# Patient Record
Sex: FEMALE | Employment: OTHER | URBAN - METROPOLITAN AREA
[De-identification: names, ages, dates, MRNs, and addresses within clinical notes are randomized per-mention and may not be internally consistent; named-entity substitution may affect disease eponyms.]

---

## 2019-11-24 ENCOUNTER — HOSPITAL ENCOUNTER (INPATIENT)
Facility: CLINIC | Age: 49
LOS: 9 days | Discharge: HOME | DRG: 885 | End: 2019-12-04
Attending: EMERGENCY MEDICINE | Admitting: PSYCHIATRY & NEUROLOGY
Payer: COMMERCIAL

## 2019-11-24 DIAGNOSIS — R45.851 SUICIDAL THOUGHTS: ICD-10-CM

## 2019-11-24 DIAGNOSIS — F29 PSYCHOSIS, UNSPECIFIED PSYCHOSIS TYPE (H): Primary | ICD-10-CM

## 2019-11-24 DIAGNOSIS — R44.0 AUDITORY HALLUCINATIONS: ICD-10-CM

## 2019-11-24 LAB
ALBUMIN SERPL-MCNC: 3.5 G/DL (ref 3.4–5)
ALBUMIN UR-MCNC: NEGATIVE MG/DL
ALP SERPL-CCNC: 95 U/L (ref 40–150)
ALT SERPL W P-5'-P-CCNC: 15 U/L (ref 0–50)
AMPHETAMINES UR QL SCN: POSITIVE
ANION GAP SERPL CALCULATED.3IONS-SCNC: 3 MMOL/L (ref 3–14)
APPEARANCE UR: CLEAR
AST SERPL W P-5'-P-CCNC: 10 U/L (ref 0–45)
BARBITURATES UR QL: NEGATIVE
BASOPHILS # BLD AUTO: 0 10E9/L (ref 0–0.2)
BASOPHILS NFR BLD AUTO: 0.3 %
BENZODIAZ UR QL: NEGATIVE
BILIRUB SERPL-MCNC: 0.8 MG/DL (ref 0.2–1.3)
BILIRUB UR QL STRIP: NEGATIVE
BUN SERPL-MCNC: 15 MG/DL (ref 7–30)
CALCIUM SERPL-MCNC: 8.4 MG/DL (ref 8.5–10.1)
CANNABINOIDS UR QL SCN: NEGATIVE
CHLORIDE SERPL-SCNC: 107 MMOL/L (ref 94–109)
CO2 SERPL-SCNC: 27 MMOL/L (ref 20–32)
COCAINE UR QL: NEGATIVE
COLOR UR AUTO: YELLOW
CREAT SERPL-MCNC: 0.53 MG/DL (ref 0.52–1.04)
CRP SERPL-MCNC: 6.1 MG/L (ref 0–8)
DIFFERENTIAL METHOD BLD: NORMAL
EOSINOPHIL # BLD AUTO: 0.1 10E9/L (ref 0–0.7)
EOSINOPHIL NFR BLD AUTO: 1.3 %
ERYTHROCYTE [DISTWIDTH] IN BLOOD BY AUTOMATED COUNT: 15 % (ref 10–15)
ERYTHROCYTE [SEDIMENTATION RATE] IN BLOOD BY WESTERGREN METHOD: 28 MM/H (ref 0–30)
ETHANOL SERPL-MCNC: <0.01 G/DL
GFR SERPL CREATININE-BSD FRML MDRD: >90 ML/MIN/{1.73_M2}
GLUCOSE SERPL-MCNC: 121 MG/DL (ref 70–99)
GLUCOSE UR STRIP-MCNC: NEGATIVE MG/DL
HCT VFR BLD AUTO: 37.8 % (ref 35–47)
HGB BLD-MCNC: 12.2 G/DL (ref 11.7–15.7)
HGB UR QL STRIP: ABNORMAL
IMM GRANULOCYTES # BLD: 0 10E9/L (ref 0–0.4)
IMM GRANULOCYTES NFR BLD: 0.1 %
KETONES UR STRIP-MCNC: 10 MG/DL
LEUKOCYTE ESTERASE UR QL STRIP: ABNORMAL
LYMPHOCYTES # BLD AUTO: 1.9 10E9/L (ref 0.8–5.3)
LYMPHOCYTES NFR BLD AUTO: 25 %
MCH RBC QN AUTO: 27.7 PG (ref 26.5–33)
MCHC RBC AUTO-ENTMCNC: 32.3 G/DL (ref 31.5–36.5)
MCV RBC AUTO: 86 FL (ref 78–100)
MONOCYTES # BLD AUTO: 0.7 10E9/L (ref 0–1.3)
MONOCYTES NFR BLD AUTO: 8.7 %
MUCOUS THREADS #/AREA URNS LPF: PRESENT /LPF
NEUTROPHILS # BLD AUTO: 4.9 10E9/L (ref 1.6–8.3)
NEUTROPHILS NFR BLD AUTO: 64.6 %
NITRATE UR QL: NEGATIVE
NRBC # BLD AUTO: 0 10*3/UL
NRBC BLD AUTO-RTO: 0 /100
OPIATES UR QL SCN: NEGATIVE
PCP UR QL SCN: NEGATIVE
PH UR STRIP: 6 PH (ref 5–7)
PLATELET # BLD AUTO: 266 10E9/L (ref 150–450)
POTASSIUM SERPL-SCNC: 3.4 MMOL/L (ref 3.4–5.3)
PROT SERPL-MCNC: 7.3 G/DL (ref 6.8–8.8)
RBC # BLD AUTO: 4.4 10E12/L (ref 3.8–5.2)
RBC #/AREA URNS AUTO: 8 /HPF (ref 0–2)
SODIUM SERPL-SCNC: 137 MMOL/L (ref 133–144)
SOURCE: ABNORMAL
SP GR UR STRIP: 1.02 (ref 1–1.03)
SQUAMOUS #/AREA URNS AUTO: 2 /HPF (ref 0–1)
TSH SERPL DL<=0.005 MIU/L-ACNC: 2.38 MU/L (ref 0.4–4)
UROBILINOGEN UR STRIP-MCNC: NORMAL MG/DL (ref 0–2)
WBC # BLD AUTO: 7.6 10E9/L (ref 4–11)
WBC #/AREA URNS AUTO: 1 /HPF (ref 0–5)

## 2019-11-24 PROCEDURE — 81001 URINALYSIS AUTO W/SCOPE: CPT | Performed by: EMERGENCY MEDICINE

## 2019-11-24 PROCEDURE — 86140 C-REACTIVE PROTEIN: CPT | Performed by: EMERGENCY MEDICINE

## 2019-11-24 PROCEDURE — 85025 COMPLETE CBC W/AUTO DIFF WBC: CPT | Performed by: EMERGENCY MEDICINE

## 2019-11-24 PROCEDURE — 99285 EMERGENCY DEPT VISIT HI MDM: CPT | Mod: 25

## 2019-11-24 PROCEDURE — 80307 DRUG TEST PRSMV CHEM ANLYZR: CPT | Performed by: EMERGENCY MEDICINE

## 2019-11-24 PROCEDURE — 90791 PSYCH DIAGNOSTIC EVALUATION: CPT

## 2019-11-24 PROCEDURE — 25000132 ZZH RX MED GY IP 250 OP 250 PS 637: Performed by: EMERGENCY MEDICINE

## 2019-11-24 PROCEDURE — 85652 RBC SED RATE AUTOMATED: CPT | Performed by: EMERGENCY MEDICINE

## 2019-11-24 PROCEDURE — 80320 DRUG SCREEN QUANTALCOHOLS: CPT | Performed by: EMERGENCY MEDICINE

## 2019-11-24 PROCEDURE — 84443 ASSAY THYROID STIM HORMONE: CPT | Performed by: EMERGENCY MEDICINE

## 2019-11-24 PROCEDURE — 80053 COMPREHEN METABOLIC PANEL: CPT | Performed by: EMERGENCY MEDICINE

## 2019-11-24 PROCEDURE — 87086 URINE CULTURE/COLONY COUNT: CPT | Performed by: EMERGENCY MEDICINE

## 2019-11-24 PROCEDURE — 36415 COLL VENOUS BLD VENIPUNCTURE: CPT

## 2019-11-24 RX ORDER — ACETAMINOPHEN 325 MG/1
650 TABLET ORAL ONCE
Status: COMPLETED | OUTPATIENT
Start: 2019-11-24 | End: 2019-11-24

## 2019-11-24 RX ADMIN — ACETAMINOPHEN 650 MG: 325 TABLET, FILM COATED ORAL at 20:34

## 2019-11-24 ASSESSMENT — ENCOUNTER SYMPTOMS
HALLUCINATIONS: 1
ARTHRALGIAS: 1

## 2019-11-24 ASSESSMENT — MIFFLIN-ST. JEOR: SCORE: 1098.96

## 2019-11-25 PROBLEM — F29 PSYCHOSIS (H): Status: ACTIVE | Noted: 2019-11-25

## 2019-11-25 LAB
BACTERIA SPEC CULT: NORMAL
Lab: NORMAL
SPECIMEN SOURCE: NORMAL

## 2019-11-25 PROCEDURE — 25000132 ZZH RX MED GY IP 250 OP 250 PS 637: Performed by: EMERGENCY MEDICINE

## 2019-11-25 PROCEDURE — 12400000 ZZH R&B MH

## 2019-11-25 PROCEDURE — 25000132 ZZH RX MED GY IP 250 OP 250 PS 637: Performed by: PSYCHIATRY & NEUROLOGY

## 2019-11-25 RX ORDER — IBUPROFEN 200 MG
800 TABLET ORAL EVERY 8 HOURS PRN
COMMUNITY

## 2019-11-25 RX ORDER — OLANZAPINE 10 MG/1
10 TABLET, ORALLY DISINTEGRATING ORAL 2 TIMES DAILY PRN
Status: DISCONTINUED | OUTPATIENT
Start: 2019-11-25 | End: 2019-11-25 | Stop reason: DRUGHIGH

## 2019-11-25 RX ORDER — OLANZAPINE 10 MG/2ML
10 INJECTION, POWDER, FOR SOLUTION INTRAMUSCULAR DAILY PRN
Status: DISCONTINUED | OUTPATIENT
Start: 2019-11-25 | End: 2019-11-25

## 2019-11-25 RX ORDER — MULTIVIT WITH MINERALS/LUTEIN
500 TABLET ORAL DAILY
COMMUNITY

## 2019-11-25 RX ORDER — OLANZAPINE 10 MG/2ML
10 INJECTION, POWDER, FOR SOLUTION INTRAMUSCULAR EVERY 6 HOURS PRN
Status: DISCONTINUED | OUTPATIENT
Start: 2019-11-25 | End: 2019-12-04 | Stop reason: HOSPADM

## 2019-11-25 RX ORDER — ASCORBIC ACID 500 MG
500 TABLET ORAL DAILY
Status: DISCONTINUED | OUTPATIENT
Start: 2019-11-25 | End: 2019-12-04 | Stop reason: HOSPADM

## 2019-11-25 RX ORDER — IBUPROFEN 400 MG/1
800 TABLET, FILM COATED ORAL EVERY 8 HOURS PRN
Status: DISCONTINUED | OUTPATIENT
Start: 2019-11-25 | End: 2019-12-04 | Stop reason: HOSPADM

## 2019-11-25 RX ORDER — OLANZAPINE 10 MG/1
10 TABLET, ORALLY DISINTEGRATING ORAL EVERY 6 HOURS PRN
Status: DISCONTINUED | OUTPATIENT
Start: 2019-11-25 | End: 2019-12-04 | Stop reason: HOSPADM

## 2019-11-25 RX ORDER — HYDROXYZINE HYDROCHLORIDE 25 MG/1
25 TABLET, FILM COATED ORAL EVERY 4 HOURS PRN
Status: DISCONTINUED | OUTPATIENT
Start: 2019-11-25 | End: 2019-12-04 | Stop reason: HOSPADM

## 2019-11-25 RX ORDER — LORAZEPAM 1 MG/1
1 TABLET ORAL EVERY 8 HOURS PRN
Status: DISCONTINUED | OUTPATIENT
Start: 2019-11-25 | End: 2019-11-25

## 2019-11-25 RX ADMIN — LORAZEPAM 1 MG: 1 TABLET ORAL at 04:51

## 2019-11-25 RX ADMIN — OXYCODONE HYDROCHLORIDE AND ACETAMINOPHEN 500 MG: 500 TABLET ORAL at 15:59

## 2019-11-25 RX ADMIN — IBUPROFEN 800 MG: 400 TABLET ORAL at 15:59

## 2019-11-25 RX ADMIN — OLANZAPINE 10 MG: 10 TABLET, ORALLY DISINTEGRATING ORAL at 16:52

## 2019-11-25 ASSESSMENT — ACTIVITIES OF DAILY LIVING (ADL)
RETIRED_EATING: 0-->INDEPENDENT
DRESS: SCRUBS (BEHAVIORAL HEALTH);PROMPTS
BATHING: 0-->INDEPENDENT
HYGIENE/GROOMING: PROMPTS
COGNITION: 0 - NO COGNITION ISSUES REPORTED
RETIRED_COMMUNICATION: 0-->UNDERSTANDS/COMMUNICATES WITHOUT DIFFICULTY
DRESS: 0-->INDEPENDENT
AMBULATION: 0-->INDEPENDENT
ORAL_HYGIENE: PROMPTS
LAUNDRY: UNABLE TO COMPLETE
FALL_HISTORY_WITHIN_LAST_SIX_MONTHS: NO
TOILETING: 0-->INDEPENDENT
TRANSFERRING: 0-->INDEPENDENT
SWALLOWING: 0-->SWALLOWS FOODS/LIQUIDS WITHOUT DIFFICULTY

## 2019-11-25 ASSESSMENT — MIFFLIN-ST. JEOR: SCORE: 1062.67

## 2019-11-25 NOTE — ED PROVIDER NOTES
No acute issues.  Accepted under [facility].  MD Kathy Sanchez, Lucia Cordon MD  11/25/19 0261

## 2019-11-25 NOTE — PROGRESS NOTES
.Welcome packet reviewed with patient. Information reviewed includes getting emergency help, preventing infections, understanding your care, using medication safely, reducing falls, preventing pressure ulcers, smoking cessation, powerful choices and Patients Bill of Rights. Pt. given tour of the unit and instruction on use of facility including emergency call light. Program schedule reviewed with patient. Questions regarding the unit addressed. Pt. Search completed and belongings inventoried.   .Nursing assessment complete including patient and medication profiles. Risk assessments completed addressing suicide,fall,skin,nutrition and safety issues. Care plan initiated. Assessments reviewed with physician and admit orders received. Video monitoring in progress, Patient Informed.

## 2019-11-25 NOTE — PROGRESS NOTES
Admitted from the ER with auditory hallucinations, labile mood changes.  Patient lives in [place] with her  but her family picked her up because  had a heart attack and he can not longer take care of her.   Her thoughts are disorganized. Speech is fast, speaks English and Yakut, and she refuses an .  She was born and raised in [facility] and she appears to have lost receptive and expressive skills in English.    Refuses an , she tells me that I should be here working every day because I do speak Scottish,  She appears to hear voices,  Reported voices are around her. Has poor insight, labile, loud at times, tense, can not sit still, denies any SI.

## 2019-11-25 NOTE — ED PROVIDER NOTES
History     Chief Complaint:  Joint Pain and Suicidal    HPI   Donovan Jauregui is a 49 year old female who presents with joint pain and suicidal. The patient's sister states she picked the patient up from [place] 5 days prior to help her with her primary care. She says the patient's  is unable to care for her due to recently having a heart attack. The sister reports the patient has been known to wander off when she is angered and has to be found a couple days later. The patient states she has rheumatoid arthritis and has not been taking medication for it. During evaluation, the patient admits to having vague suicidal thoughts but denies any plans. She further admits to hearing many voices for the past 1-2 years but has not been seen for it. She denies homicidal ideation. The patient denies drug use, although the sister says she has heard the patient uses methamphetamines.    Allergies:  No known drug allergies    Medications:    The patient is not currently taking any prescribed medications.    Past Medical History:    Mood disorder  Suicidal ideation    Past Surgical History:    History reviewed. No pertinent surgical history.    Family History:    History reviewed. No pertinent family history.     Social History:  Smoking status: Yes  Alcohol use: Yes  Drug use: No  The patient presents to the emergency department with sister.     Review of Systems   Musculoskeletal: Positive for arthralgias.   Psychiatric/Behavioral: Positive for hallucinations and suicidal ideas.   All other systems reviewed and are negative.    Physical Exam   Patient Vitals for the past 24 hrs:   BP Temp Temp src Pulse Resp SpO2 Height Weight   11/24/19 1956 (!) 156/80 98.5  F (36.9  C) Oral 108 14 98 % 1.524 m (5') 56.2 kg (124 lb)     Physical Exam  Constitutional: Small  female supine  HENT: No signs of trauma.   Eyes: EOM are normal. Pupils are equal, round, and reactive to light.   Neck: Normal range of motion. No JVD present.  No cervical adenopathy.  Cardiovascular: 1/6  systolic murmur LSB;  Regular rhythm  Pulmonary/Chest: Bilateral breath sounds normal. No wheezes, rhonchi or rales.  Abdominal: Soft. No tenderness. No rebound or guarding.   Musculoskeletal: No obvious joint swelling or thickening for both upper and lower extremities. no edema  Lymphadenopathy: No lymphadenopathy.   Neurological: Alert and oriented to person, place, and time. Normal strength. Coordination normal.   Psych: Animated, no overt psychosis, admits to vague suicidal thoughts with no plan, no homicidal thoughts, admits to hearing voices over the past year.  Skin: Skin is warm and dry. No rash noted. No erythema.     Emergency Department Course   Laboratory:  CBC: 7.6, HGB 12.2,   CMP: Glucose 121, calcium 8.4, Creatinine 0.53  TSH 2.38  CRP 6.1  Alcohol <0.01  Drug abuse screen: amphetamine pos, o/w neg.  Sed rate 28    UA: ketones 10, blood small, leukocyte large, RBC 8, squamous 2, mucous present, o/w neg.  Urine culture: pending    Interventions:  2034 Tylenol 650 mg PO    Emergency Department Course:  Past medical records, nursing notes, and vitals reviewed.  2005: I performed an exam of the patient and obtained history, as documented above.    IV inserted and blood drawn.    0005: I discussed the patient with DEC.    0013: Patient was placed on a 72-hour hold for admission.    Impression & Plan    Medical Decision Making:  Donovan Jauregui is a 49 year old female who presents to the ED with her sister. She had her sister bus up from [place] because she has been disappearing and not showing up for many days. Her  has been unable to care for her and had her come up here. Her sister thinks she has used amphetamines in the past and had remote closed head trauma. The patient has been having suicidal thought, although does not have a plan but states she would be okay not waking up in the morning. She has not tried or done anything yet. She also admits  to hearing voices over at least the past year. The only medical problems the patient's sister is aware of is she has rheumatoid arthritis and is on Humira, with her last dose a month ago. The patient itself is quite animated. She denies any drug use. She just came here because her joints were sore she states. The sister is very fearful that the patient will be upset and take off and not be able to care for herself. Workup medically was unremarkably other than amphetamines in the toxin screen. Patient was evaluated by tele DEC, who felt a 72-hour hold was in order for admission. I have placed patient on boarding orders, including Zyprexa for medication and Ativan as well. All forms have been filled out and patient will be admitted once a bed is found either here or [facility]. I also placed a psych consult to be done in the morning if the patient is still here.       Diagnosis:    ICD-10-CM   1. Suicidal thoughts R45.851   2. Auditory hallucinations R44.0     Disposition:  Placed on 72-hour hold for admission.            Scribe Disclosure:  I, Jayesh Fragoso, am serving as a scribe at 8:05 PM on 11/24/2019 to document services personally performed by Jason Medel MD based on my observations and the provider's statements to me.    [facility] EMERGENCY DEPARTMENT         Jason Medel MD  11/25/19 0204

## 2019-11-25 NOTE — PROGRESS NOTES
"She is abrupt, refuses to signs papers, she is paranoid, looks over her shoulders, she said  \"  I going back to [place], tomorrow morning\"  ,She tells me, that her family does not want her here, she said she took a bus from [place] to visit her sisters, but her sisters are not willing to have her at home.  \" That is the reason, Dionne her sister ,dropped her to the Hospital.    I spoke to her in English and she switch the conversation to Azeri. Refuses an .   "

## 2019-11-25 NOTE — PHARMACY-ADMISSION MEDICATION HISTORY
Pharmacy Medication History  Admission medication history interview status for the 11/24/2019  admission is complete. See EPIC admission navigator for prior to admission medications     Medication history sources: Patient  Medication history source reliability: Poor  Adherence assessment: has not seen a physician in a long time so has not been taking any prescription medications for about a year    Significant changes made to the medication list:  none      Additional medication history information:   Used to use humira and something for reflux (possibly omeprazole)    Medication reconciliation completed by provider prior to medication history? No    Time spent in this activity: 20 minutes      Prior to Admission medications    Medication Sig Last Dose Taking? Auth Provider   ibuprofen (ADVIL/MOTRIN) 200 MG tablet Take 800 mg by mouth every 8 hours as needed for mild pain  Yes Unknown, Entered By History   vitamin C (ASCORBIC ACID) 250 MG tablet Take 500 mg by mouth daily Unsure of strength  Yes Unknown, Entered By History

## 2019-11-25 NOTE — ED TRIAGE NOTES
Patient states she is having pain in her bones everywhere.  Running fever, also having mood swings, crying.  Mental health issues that haven't been addressed.  Suicidal.  Wants to go to sleep & not wake up.  Thoughts started a month ago.  Not taking medications & does not remember names.

## 2019-11-25 NOTE — ED NOTES
Patient continuing to refuse for therapeutic search.  Patient placed on 1:1 continuous observation.  MD and charge nurse contacted.

## 2019-11-25 NOTE — ED PROVIDER NOTES
Signed out by Dr. Medel.  In brief, has hallucinations and suicidal thoughts.  History of running away in [place] and  helped arrange bus here to be with sister.  Sister brought her in.  DEC evaluated her.  Is pending admission.         Oralia Gaytan MD  12/06/19 2291

## 2019-11-25 NOTE — ED NOTES
Patient transported upstairs with ERT and security.  Patients sister updated with permission from patient.

## 2019-11-25 NOTE — PROGRESS NOTES
11/25/19 1430   Patient Belongings   Did you bring any home meds/supplements to the hospital?  No   Patient Belongings locker   Patient Belongings Put in Hospital Secure Location (Security or Locker, etc.) other (see comments)   Belongings Search Yes   Clothing Search Yes   Second Staff Susan, RN     1x mcdonald backpack  1x teal dress  1x pair brn slippers  1x blue jeans  1x grey shorts  1x teal purse w/ no contents   1x address book w/misc paperwork  1x pack of wash clothes  1x tan bra  1x blue knit hat  1x swim suit top  1x maroon jeans  1x black leggings   1x scarf  1x blue tshirt   1x pink long sleeve   1x blue shorts  1x pack of underwear   1x jeans  misc paperwork  1x chapstick  1x empty black wallet  1x multi colored purse  1x black wallet  1x pack of cigaretts w/lighter  1x hand lotion  1x black sweatpants w/string  1x purple jacket with string  1x pair of black boots                  Admission:  I am responsible for any personal items that are not sent to the safe or pharmacy.  [facility] is not responsible for loss, theft or damage of any property in my possession.    Signature:  _________________________________ Date: _______  Time: _____                                              Staff Signature:  ____________________________ Date: ________  Time: _____      2nd Staff person, if patient is unable/unwilling to sign:    Signature: ________________________________ Date: ________  Time: _____     Discharge:  [facility] has returned all of my personal belongings:    Signature: _________________________________ Date: ________  Time: _____                                          Staff Signature:  ____________________________ Date: ________  Time: _____

## 2019-11-26 PROCEDURE — 25000132 ZZH RX MED GY IP 250 OP 250 PS 637: Performed by: PSYCHIATRY & NEUROLOGY

## 2019-11-26 PROCEDURE — 99222 1ST HOSP IP/OBS MODERATE 55: CPT | Performed by: PHYSICIAN ASSISTANT

## 2019-11-26 PROCEDURE — 12400000 ZZH R&B MH

## 2019-11-26 RX ORDER — OLANZAPINE 5 MG/1
5 TABLET, ORALLY DISINTEGRATING ORAL 2 TIMES DAILY
Status: DISCONTINUED | OUTPATIENT
Start: 2019-11-26 | End: 2019-11-29

## 2019-11-26 RX ADMIN — OLANZAPINE 5 MG: 5 TABLET, ORALLY DISINTEGRATING ORAL at 21:08

## 2019-11-26 RX ADMIN — OLANZAPINE 5 MG: 5 TABLET, ORALLY DISINTEGRATING ORAL at 12:01

## 2019-11-26 RX ADMIN — OXYCODONE HYDROCHLORIDE AND ACETAMINOPHEN 500 MG: 500 TABLET ORAL at 08:44

## 2019-11-26 ASSESSMENT — MIFFLIN-ST. JEOR: SCORE: 1059.95

## 2019-11-26 ASSESSMENT — ACTIVITIES OF DAILY LIVING (ADL)
LAUNDRY: UNABLE TO COMPLETE
DRESS: SCRUBS (BEHAVIORAL HEALTH)
DRESS: SCRUBS (BEHAVIORAL HEALTH);INDEPENDENT
LAUNDRY: WITH SUPERVISION
ORAL_HYGIENE: PROMPTS
ORAL_HYGIENE: PROMPTS
HYGIENE/GROOMING: PROMPTS
HYGIENE/GROOMING: PROMPTS

## 2019-11-26 NOTE — H&P
Pt seen for initial psychiatric evaluation, please see my dictation for details and recommendations.    Tl Licona MD

## 2019-11-26 NOTE — PLAN OF CARE
Given cloths remains in scrubs seems disorganized states does has not had a not use drugs.ate meals resting. Speaks english.Restless.Orientated x3.

## 2019-11-26 NOTE — PLAN OF CARE
Pt presents with tense affect and anxious mood. Pt unable to hold conversation with staff and appeared internally preoccupied. Would be walking around shirtless and need redirection back to her room. Pt making kissing faces at male staff. Spilled her drink and is overall unsteady. No SI stated.

## 2019-11-26 NOTE — H&P
[facility]    History and Physical  Hospitalist       Date of Admission:  11/24/2019    Assessment & Plan   Donovan Jauregui is a 49 year old female, here from [place], who presented to the Emergency Department for evaluation of suicidal ideation and psychosis.     Psychosis.   Substance abuse  Patient presents with family from [place] where she was previously living until her  became unable to care for her due to health issues. On presentation patient reported vague suicidal thought without a plan. She reports hearing voices and appears to be responding to internal stimuli. UDS is positive for amphetamines.   --management is per Psychiatry    Reported history of RA. Unable to provide more details and we do not have medical history on file. She cannot tell me where she received care in [place]. She reports she is on Humira, confirmed by sister in the ED, but cannot tell me her last dose. Unremarkable exam, she denies joint pain at this time.   --follow up outpatient, will need help establishing care if she is to remain here in [place]     Abnormal UA. UA is notable for small blood, 8 RBC, LE. Culture is negative. Recommend repeating UA at some point in outpatient setting to ensure resolution of hematuria. Denies current menstruation.     DVT Prophylaxis: Ambulate every shift    Disposition: Expected discharge is per psychiatry. Staff to assist patient with IM follow up upon discharge if she plans to remain here in [place].     This patient was seen and examined with Dr. Alejandro who agrees with the above plan.    Elodia Naylor PA-C    Primary Care Physician   Physician No Ref-Primary    Chief Complaint   SI, psychosis     History is obtained from the patient    History of Present Illness   Donovan Jauregui is a 49 year old female, from [place], who presented to the Emergency Department with family for psychiatric evaluation. History from the patient herself is limited due to psychosis. Per report, the  patient was living with her  in [place] until he had a heart attack and felt he was no longer able to care for her. Per ED note she had been disappearing for several days at a time and family was concerned she may be using drugs. Her sister reportedly had her come up from [place] to stay with her. The patient reported she has been hearing voices for at least a year. Reported suicidal thoughts without a plan. Denies attempts at self harm. She was placed on 72 hour hold in the ED after DEC assessment in the ED and admitted to inpatient psychiatry.     She is presently evaluated in her room in the ITC portion of the inpatient psychiatry unit. She denies any complaints at this time. Denies pain. Denies recent illness or injury. No CP, SOB, nausea, dizziness. She is not sure where she received her care in [place]. Denies recent drug use though UDS positive. Denies any history of IV drug use. Confirms she has been told in the past she has a heart murmur. She is not on her period at this time, reports last one was 4 months ago or so.     Past Medical History    RA, reportedly on Humira, last dose unknown    Denies history of CAD, CHF, lung disease, DM    Past Surgical History    x2    Prior to Admission Medications   Prior to Admission Medications   Prescriptions Last Dose Informant Patient Reported? Taking?   ibuprofen (ADVIL/MOTRIN) 200 MG tablet  Self Yes Yes   Sig: Take 800 mg by mouth every 8 hours as needed for mild pain   vitamin C (ASCORBIC ACID) 250 MG tablet  Self Yes Yes   Sig: Take 500 mg by mouth daily Unsure of strength      Facility-Administered Medications: None     Allergies   No Known Allergies    Social History   Denies drug use. Tells me she is not . Has 2 adult children. Reports occasional alcohol use. Smokes daily, though cannot tell me how much.     Family History   Family history reviewed with patient, though limited, and is noncontributory.    Review of Systems   The 10 point  Review of Systems is negative other than noted in the HPI or here.     Physical Exam   Temp: 97.9  F (36.6  C) Temp src: Oral BP: 103/65 Pulse: 76 Heart Rate: 87 Resp: 16 SpO2: 99 % O2 Device: None (Room air)    Vital Signs with Ranges  Temp:  [97.9  F (36.6  C)-99.5  F (37.5  C)] 97.9  F (36.6  C)  Pulse:  [76] 76  Heart Rate:  [87] 87  Resp:  [16] 16  BP: (103-113)/(52-67) 103/65  SpO2:  [99 %-100 %] 99 %  116 lbs 0 oz    Temp: 97.9  F (36.6  C) Temp src: Oral BP: 103/65 Pulse: 76 Heart Rate: 87 Resp: 16 SpO2: 99 % O2 Device: None (Room air)    Vitals:    11/24/19 1956 11/25/19 1425   Weight: 56.2 kg (124 lb) 52.6 kg (116 lb)     Vital Signs with Ranges  Temp:  [97.9  F (36.6  C)-99.5  F (37.5  C)] 97.9  F (36.6  C)  Pulse:  [76] 76  Heart Rate:  [87] 87  Resp:  [16] 16  BP: (103-113)/(52-67) 103/65  SpO2:  [99 %-100 %] 99 %  No intake/output data recorded.    Constitutional: Alert, sitting up in bed. Knows we are in [place]. Calm and cooperative.   ENT: normal cephalic, moist mucous membranes  Respiratory: Lungs clear to auscultation bilaterally, no increased work of breathing or wheezing  Cardiovascular: Regular rate and rhythm, +1 systolic murmur, no LE edema, distal pulses +2/4  GI:  active bowel sounds, abdomen soft, non-tender  Skin/Integumen: warm, dry. No rash noted  MSK: moves all four extremities. Normal tone  Neuro:  Speech is clear. Face symmetric. No focal deficits. Gait not observed       Data   Data reviewed today:  I personally reviewed no images or EKG's today.  Recent Labs   Lab 11/24/19 2026   WBC 7.6   HGB 12.2   MCV 86         POTASSIUM 3.4   CHLORIDE 107   CO2 27   BUN 15   CR 0.53   ANIONGAP 3   YOKO 8.4*   *   ALBUMIN 3.5   PROTTOTAL 7.3   BILITOTAL 0.8   ALKPHOS 95   ALT 15   AST 10       No results found for this or any previous visit (from the past 24 hour(s)).

## 2019-11-26 NOTE — H&P
"Admitted:     11/24/2019      IDENTIFYING DATA AND REASON FOR REFERRAL:  Tamica Jauregui, as she prefers to be addressed, is a 49-year-old woman who reports she is  and has a 35-year-old son and an 18-year-old daughter.  She reports she is disabled on account of history of rheumatoid arthritis and last worked in [employer].  She presented to [facility] ED with her sister on account of disorganized thinking.  The patient reportedly had been moved to [place] by her sister because her  could no longer care for her.  She was admitted on a 72-hour hold that expires on 11/28/2019 at 12:13 a.m.  Information was gathered through direct patient contact as well as chart review.      CHIEF COMPLAINT:  \"I came to visit my sister.\"      HISTORY OF PRESENT ILLNESS:  Donovan Jauregui reports no previous psychiatric hospitalization or treatment.  She tells me that she had been residing in [place], [place] with her ex- until she decided to visit her sister in [place].  She states that her sister and her sister's daughter were having conflicts on account of which she claims her niece pressured her sister to kick her out; however, collateral information obtained from [facility] suggests that the patient's  suffered a stroke on account of which he could no longer care for her and requested assistance from her siblings.  Her sister, Dionne, reportedly went down to [place] to pick the patient up and brought her back to [place].  Since her arrival, she reportedly has been displaying bizarre behavior, as a result of which she was brought to [facility] for evaluation and treatment.        The patient tells me that she has been experiencing auditory hallucinations for the last couple of years, but denies that she was taking any medications.  She states the voices whisper to her things like \"lay low\" or \"read.\"  She states that she hears voices both in English and in Italian as she is " bilingual.  She denies that they give her any specific commands to harm herself or others.  She denies any use of alcohol or illicit chemicals.  She states she does feel somewhat depressed and endorses anhedonia, depressed mood, social isolation, poor sleep and poor concentration.  She does not endorse any self-harm thoughts, plan or intent.  She denies history consistent with kurt.  She does not endorse experiencing panic attacks, obsessions, compulsions or symptoms suggestive of PTSD. Her urine was positive for amphetamines on admission but she denies any drug use.      PAST PSYCHIATRIC HISTORY:  None reported.      CHEMICAL USE HISTORY:  None reported.      PAST MEDICAL HISTORY:  History of heart murmur, history of rheumatoid arthritis.      PAST SURGICAL HISTORY:   x2, left foot biopsy.      MEDICATIONS PRIOR TO ADMISSION:   1.  Ibuprofen 800 mg every 8 hours as needed for pain.   2.  Ascorbic acid 500 mg p.o. daily.      ALLERGIES:  NO KNOWN DRUG ALLERGIES.      FAMILY PSYCHIATRIC HISTORY:  None reported.      SOCIAL HISTORY:  The patient reports she was born in [place], [place].  She states she has 2 living sisters and 3 brothers.  She dropped out of school after the 10th grade and never obtained her GED.  She denies  or criminal history.  She tells me she was  for 13 years, but is .  She has a 35-year-old son and an 18-year-old daughter.  She was residing with her ex- in an apartment in [place], [place] before she was moved to [place].      REVIEW OF SYSTEMS:  I refer the reader to the 10-point review of systems documented by Elodia Naylor PA-C, on 2019 at 7.33 a.m.      VITAL SIGNS:  Blood pressure 104/57, pulse 72, respirations 18, temperature 98.1, weight 52.3 kg.      MENTAL STATUS EXAMINATION:  This is a petite middle-aged woman who appears her stated age of 49.  She is dressed in hospital scrubs and ambulates on her own without difficulty.   "She makes fair eye contact.  She speaks softly but clearly and coherently.  Her mood is described as \"good\" and her affect is labile.  Her thought process is circumstantial and she displays flight of ideas.  She endorses the presence of auditory hallucinations that she claims are not command in nature.  She does not endorse any significant paranoia.  Her attention and concentration are fair.  Her impulse control is marginal.  She displays fair recall of recent events and remote recall is intact.  Her gait and station are within normal limits.  Her muscle strength is adequate.  Her language is appropriate.  Her associations are concrete.  Risk assessment at this time is considered moderate to high.  She displays limited insight and judgment.      DIAGNOSTIC IMPRESSION:  Donovan Jauregui is a 49-year-old  mother of 2, who reports no prior psychiatric contact or treatment, but presents with a 2-year history of experiencing auditory hallucinations in the absence of chemical use.  It appears she may have had an underlying psychotic illness before relocating to [place] with her sister.      ADMITTING DIAGNOSES:   1.  Psychotic disorder, unspecified.   2.  Amphetamine use disorder (urine positive for amphetamines).  Rule out amphetamine induced psychotic disorder.   3.  Rheumatoid arthritis by history.      PLAN:  The patient appears to be experiencing psychotic symptoms which may be from her use of amphetamines even though she does not endorse any illicit drug use, but urine toxicology screen is positive for amphetamines.  She admits to experiencing auditory hallucinations for the last 2 years on account of which she was offered olanzapine at 5 mg twice a day.  She will be moved to the step-down unit and encouraged to participate in individual, milieu and group therapy.  Staff will provide a safe environment.        Estimated length of stay 3-5 days.         SHAUNNA GRANT MD             D: 11/26/2019   " T: 2019   MT: KAYLA      Name:     QUIN PLEITEZ   MRN:      [id number]        Account:      [id number]   :      1970        Admitted:     2019                   Document: [id number]

## 2019-11-26 NOTE — PROGRESS NOTES
1630-Pt. disorganized and becoming agitated. Pounding on nurses station window and difficult to redirect. Medicated with Zyprexa 10mg. Ate dinner. Bed resting after dinner. Up x2 to bathroom. Remains confused and disorganized.

## 2019-11-27 PROCEDURE — 25000132 ZZH RX MED GY IP 250 OP 250 PS 637: Performed by: PSYCHIATRY & NEUROLOGY

## 2019-11-27 PROCEDURE — 90791 PSYCH DIAGNOSTIC EVALUATION: CPT

## 2019-11-27 PROCEDURE — 12400000 ZZH R&B MH

## 2019-11-27 RX ADMIN — OLANZAPINE 5 MG: 5 TABLET, ORALLY DISINTEGRATING ORAL at 08:09

## 2019-11-27 RX ADMIN — OLANZAPINE 5 MG: 5 TABLET, ORALLY DISINTEGRATING ORAL at 21:24

## 2019-11-27 RX ADMIN — OXYCODONE HYDROCHLORIDE AND ACETAMINOPHEN 500 MG: 500 TABLET ORAL at 08:09

## 2019-11-27 RX ADMIN — HYDROXYZINE HYDROCHLORIDE 25 MG: 25 TABLET, FILM COATED ORAL at 21:24

## 2019-11-27 NOTE — PLAN OF CARE
BEHAVIORAL TEAM DISCUSSION    Participants: MD, RN, CM, PA, OT   Progress: Improving  Anticipated length of stay: Will remain hospitalized through the weekend  Continued Stay Criteria/Rationale:  Continued psychiatric stabilization.   Medical/Physical: Per hospitalist consult.   Precautions:   Behavioral Orders   Procedures    Code 1 - Restrict to Unit    Discontinue 1:1 attendant for suicide risk     Order Specific Question:   I have performed an in person assessment of the patient     Answer:   Based on this assessment the patient no longer requires a one on one attendant at this point in time.    Routine Programming     As clinically indicated    Status 15     Every 15 minutes.    Status Individual Observation     Order Specific Question:   CONTINUOUS 24 hours / day     Answer:   1 foot     Order Specific Question:   Indications for SIO     Answer:   Self-injury risk     Order Specific Question:   Indications for SIO     Answer:   Suicide risk     Order Specific Question:   Indications for SIO     Answer:   Medical equipment / ligature risk    Suicide precautions     Patients on Suicide Precautions should have a Combination Diet ordered that includes a Diet selection(s) AND a Behavioral Tray selection for Safe Tray - with utensils, or Safe Tray - NO utensils       Plan: Continue current medications. Will remain on 72 hour hold which expires on 12/02 at 0020  Rationale for change in precautions or plan: Continued stabilization.

## 2019-11-27 NOTE — PROGRESS NOTES
[facility] Psychiatric Progress Note      Interval History:   Pt seen, team meeting held with , nursing staff, OT, and PA's to review diagnosis and treatment plan. Staff report that patient is doing better today but she remains somewhat paranoid and has been refusing to sign any documents. She reports feeling better on her current medications and denies experiencing any self harm thoughts or plans. Tolerating medications well without significant side effects. Denies suicidal or homicidal ideation.        Review of systems:   The Review of Systems completed by Tl Licona MD is negative other than noted in the HPI     Medications:       OLANZapine zydis  5 mg Oral BID     vitamin C  500 mg Oral Daily     hydrOXYzine, ibuprofen, OLANZapine, OLANZapine zydis    Mental Status Examination:     Appearance:  awake, alert, dressed in hospital scrubs, appeared as age stated and well groomed  Attitude:  somewhat cooperative  Eye Contact:  good  Mood:  better  Affect:  mood congruent, labile and full range  Speech:  clear, coherent and normal prosody  Psychomotor Behavior:  no evidence of tardive dyskinesia, dystonia, or tics and intact station, gait and muscle tone  Thought Process:  logical, linear and goal oriented  Associations:  no loose associations  Thought Content:  no evidence of suicidal ideation or homicidal ideation and patient appears to be responding to internal stimuli  Insight:  limited  Judgment:  limited  Oriented to:  time, person, and place  Attention Span and Concentration:  limited  Recent and Remote Memory:  fair  Language: Able to name objects, Able to repeat phrases and Able to read and write  Fund of Knowledge: low-normal  Muscle Strength and Tone: normal  Gait and Station: Normal          Labs/Vitals:   /79   Pulse 76   Temp 98  F (36.7  C) (Oral)   Resp 16   Ht 1.524 m (5')   Wt 52.3 kg (115 lb 6.4 oz)   SpO2 100%   BMI 22.54 kg/m    No  results found for this or any previous visit (from the past 24 hour(s)).    Impression:   Donovan Jauregui is a 49-year-old  mother of 2, who reports no prior psychiatric contact or treatment, but presents with a 2-year history of experiencing auditory hallucinations in the absence of chemical use.  It appears she may have had an underlying psychotic illness before relocating to [place] with her sister.       DIagnoses:     1.  Psychotic disorder, unspecified.   2.  Amphetamine use disorder (urine positive for amphetamines) R/O amphetamine induced psychotic disorder.   3.  Rheumatoid arthritis by history.            Plan:   1. Written information given on medications. Side effects, risks, benefits reviewed.  2. Medication changes: None.  3. Legal Status: 72-hour hold.   4. Continue current medications and move to SDU.     Attestation:  Patient has been seen and evaluated by me, Tl Licona MD today.    PATIENT ID  Name: Donovan Jauregui  MRN:[id number]  YOB: 1970

## 2019-11-27 NOTE — PLAN OF CARE
Mood is incongruent, labile at times, she denies any suicidal ideation. She told me that the voices are better, reporting hearing voices for 15 years but she did not tell anyone.  Evasive when answering questions.    Paranoid about signing papers, refuses to sign a release of information.

## 2019-11-27 NOTE — PLAN OF CARE
Pt has been isolative and restless in her room throughout the evening shift. Came out for dinner and ate in the lounge but went back into room shortly after. Presents an elated and restless affect; brightens easily upon approach and communication. Respectful to peers and staff. No shower; untidy; scrubs. Insight and judgement is poor.

## 2019-11-27 NOTE — H&P
"Case Management Social History    This information has been obtained from patient's chart and through a personal interview with patient. She speaks both Nigerien and English, but states that she speaks and understands English enough to do the interview. She is deemed to not be a reliable historian at this time.     Reason for Admission:  Donovan \"Tamica\" Juventino is a 49 year old  female admitted to [facility] on 19. She is currently on a 72 hour hold which expires on 19 at 0020. Per her chart, she was brought to the hospital emergency department by her sister due to disorganized thinking. Her sister recently brought her from [place] to [place] as her ex- could no longer care for her in [place]. She states that she came to the hospital because of her arthritis. She also states that she wants her own place to live.       Previous Mental & Chemical Dependency History:  She states that she has no other psychiatric hospitalizations. It is unknown if she has ever been civilly committed.     She drinks one soda with caffeine daily. She smokes 1/2 pack of cigarettes daily. She states that she drinks alcohol on occasion,. She denies any issues with gambling or illicit drug usage. She has no documented history of previous chemical dependency treatment.       Social History:  She is  from her ex- who resides in [place]. She has two children, a 35 year old son and a 18 year old daughter. She was born in [place] and lived there until the age of [age]. Then she moved with her family down to [place] and has lived there ever since. Her parents are . She has three brothers and two sisters. There is no documented family history of mental illness.       Mu-ism: She does not identify with any particular Oriental orthodox or spiritual orientation. She does not wish to see a hospital  at this time.        History: She has no prior history of  " service.       Education and Work History:  She dropped out of high school in the tenth grade and did not receive her GED. She has been unemployed for the past ten to fifteen years. She last worked for [employer].       Living Situation: She currently is living in an apartment in [place] with her sister and niece.       Financial Status/Stressors:  She states that she receives SSDI.       Medication Coverage: She denies any issues affording her medication co-pays.       Insurance:  She has out of state medical assistance.       Legal Issues:  She denies any current legal issues. She is her own guardian.       Community Resources: As she recently moved to [place] from [place], she does not currently have any providers in [place].       Social Functioning:  She enjoys going to the park, swimming and going to the fair.         Discharge Considerations:  She is unsure if she will remain in [place].  discussed with her that if she does stay in [place] that she likely will need to apply for medical assistance and benefits in her county of residence in [place]. Case Management will remain available to assist with any discharge needs.

## 2019-11-28 PROCEDURE — 25000132 ZZH RX MED GY IP 250 OP 250 PS 637: Performed by: PSYCHIATRY & NEUROLOGY

## 2019-11-28 PROCEDURE — 90853 GROUP PSYCHOTHERAPY: CPT

## 2019-11-28 PROCEDURE — 12400000 ZZH R&B MH

## 2019-11-28 RX ADMIN — OLANZAPINE 5 MG: 5 TABLET, ORALLY DISINTEGRATING ORAL at 20:11

## 2019-11-28 RX ADMIN — OXYCODONE HYDROCHLORIDE AND ACETAMINOPHEN 500 MG: 500 TABLET ORAL at 08:51

## 2019-11-28 RX ADMIN — OLANZAPINE 5 MG: 5 TABLET, ORALLY DISINTEGRATING ORAL at 08:51

## 2019-11-28 RX ADMIN — IBUPROFEN 800 MG: 400 TABLET ORAL at 08:52

## 2019-11-28 RX ADMIN — HYDROXYZINE HYDROCHLORIDE 25 MG: 25 TABLET, FILM COATED ORAL at 20:11

## 2019-11-28 ASSESSMENT — ACTIVITIES OF DAILY LIVING (ADL)
ORAL_HYGIENE: INDEPENDENT
DRESS: SCRUBS (BEHAVIORAL HEALTH)
HYGIENE/GROOMING: INDEPENDENT

## 2019-11-28 NOTE — PLAN OF CARE
Pt displays paranoia and refuses to sign any paperwork despite explanations from staff and he sister. She spent time in her room bed resting and in the lounge watching tv. At first, Pt denied having auditory hallucinations but later stated that when she was in her room she heard voices but she tries to not pay attention to them. She prefers to speak Khmer and communicated with staff in this language. She visited with her sister and a friend today. She presents as calm in mood, appears paranoid and anxious at times and is guarded in conversation.

## 2019-11-28 NOTE — PROGRESS NOTES
11/28/19 1418   Sleep/Rest/Relaxation   Sleep/Rest/Relaxation awake   Day/Evening Time Hours   (awake most of the shift.)   Behavioral Health   Hallucinations auditory   Thinking poor concentration   Orientation person: oriented;place: oriented   Memory baseline memory   Insight poor   Judgement impaired   Eye Contact at examiner   Affect full range affect;sad   Mood mood is calm   Physical Appearance/Attire   (fair)   Hygiene   (fair)   Suicidality   (pt denies)   1. Wish to be Dead (Recent)   (pt denies)   2. Non-Specific Active Suicidal Thoughts (Recent)   (pt denies)   Self Injury   (pt denies)   Elopement   (none stated. none observed.)   Activity withdrawn  (at times present in the milieu.)   Speech coherent;clear   Medication Sensitivity no observed side effects;no stated side effects   Psychomotor / Gait balanced;steady   Psycho Education   Type of Intervention 1:1 intervention   Response participates with encouragement   Treatment Detail   (1:1 check in )   Activities of Daily Living   Hygiene/Grooming independent   Oral Hygiene independent   Dress scrubs (behavioral health)   Room Organization independent     Pt denies SI/SIB. She rates depression as a 4/10 and anxiety due to being away from her family as a 4/10. Per pt she is hearing voices but states they are kind of quiet.  At times she was tearful due to being hospitalized on [place] and away from her family reporting that she also misses [place]. Pt showered and changed her bedding. Her goals for the day were to eat well and to improve to which she states she completed both. She was present in the milieu at times and at other times remained in bed. Mood is calm, her affect is at times sad and at other times more up beat. She has been a pleasure to work with today.

## 2019-11-28 NOTE — PLAN OF CARE
Pt denies SI/SIB. She rates depression as a 4/10 and anxiety due to being away from her family as a 4/10. Per pt she is hearing voices but states they are kind of quiet.  At times she was tearful due to being hospitalized on Thanksgiving and away from her family reporting that she also misses [place]. Pt showered and changed her bedding. Her goals for the day were to eat well and to improve to which she states she completed both. She was present in the milieu at times and at other times remained in bed. Mood is calm, her affect is at times sad and at other times more up beat. She has been a pleasure to work with today.

## 2019-11-29 PROCEDURE — 25000132 ZZH RX MED GY IP 250 OP 250 PS 637: Performed by: PSYCHIATRY & NEUROLOGY

## 2019-11-29 PROCEDURE — 12400000 ZZH R&B MH

## 2019-11-29 RX ORDER — ALUMINA, MAGNESIA, AND SIMETHICONE 2400; 2400; 240 MG/30ML; MG/30ML; MG/30ML
30 SUSPENSION ORAL EVERY 4 HOURS PRN
Status: DISCONTINUED | OUTPATIENT
Start: 2019-11-29 | End: 2019-12-04 | Stop reason: HOSPADM

## 2019-11-29 RX ORDER — CALCIUM CARBONATE 500 MG/1
1000 TABLET, CHEWABLE ORAL EVERY 4 HOURS PRN
Status: DISCONTINUED | OUTPATIENT
Start: 2019-11-29 | End: 2019-12-04 | Stop reason: HOSPADM

## 2019-11-29 RX ORDER — OLANZAPINE 15 MG/1
15 TABLET, ORALLY DISINTEGRATING ORAL AT BEDTIME
Status: DISCONTINUED | OUTPATIENT
Start: 2019-11-29 | End: 2019-12-03

## 2019-11-29 RX ADMIN — ALUMINUM HYDROXIDE, MAGNESIUM HYDROXIDE, AND DIMETHICONE 30 ML: 400; 400; 40 SUSPENSION ORAL at 19:48

## 2019-11-29 RX ADMIN — OXYCODONE HYDROCHLORIDE AND ACETAMINOPHEN 500 MG: 500 TABLET ORAL at 08:09

## 2019-11-29 RX ADMIN — OLANZAPINE 5 MG: 5 TABLET, ORALLY DISINTEGRATING ORAL at 08:09

## 2019-11-29 RX ADMIN — CALCIUM CARBONATE (ANTACID) CHEW TAB 500 MG 1000 MG: 500 CHEW TAB at 16:18

## 2019-11-29 RX ADMIN — OLANZAPINE 15 MG: 15 TABLET, ORALLY DISINTEGRATING ORAL at 20:15

## 2019-11-29 ASSESSMENT — ACTIVITIES OF DAILY LIVING (ADL)
ORAL_HYGIENE: INDEPENDENT
DRESS: INDEPENDENT
HYGIENE/GROOMING: INDEPENDENT

## 2019-11-29 NOTE — PLAN OF CARE
Patient reported feeling better, still experiencing voices but they are less frequent.  She is always walking, running to the bathroom or pacing, can not sit still.  Mood congruent, labile, complaint with medications, she is still paranoid about signing papers.  She rather, speak in Hebrew.

## 2019-11-29 NOTE — PLAN OF CARE
BEHAVIORAL TEAM DISCUSSION     Participants: MD, RN, CM, PA, OT   Progress: Improving  Anticipated length of stay: Will remain hospitalized through the weekend  Continued Stay Criteria/Rationale:  Continued psychiatric stabilization.   Medical/Physical: Per hospitalist consult.   Precautions:         Behavioral Orders   Procedures    Code 1 - Restrict to Unit    Discontinue 1:1 attendant for suicide risk       Order Specific Question:   I have performed an in person assessment of the patient       Answer:   Based on this assessment the patient no longer requires a one on one attendant at this point in time.    Routine Programming       As clinically indicated    Status 15       Every 15 minutes.    Status Individual Observation       Order Specific Question:   CONTINUOUS 24 hours / day       Answer:   1 foot       Order Specific Question:   Indications for SIO       Answer:   Self-injury risk       Order Specific Question:   Indications for SIO       Answer:   Suicide risk       Order Specific Question:   Indications for SIO       Answer:   Medical equipment / ligature risk    Suicide precautions       Patients on Suicide Precautions should have a Combination Diet ordered that includes a Diet selection(s) AND a Behavioral Tray selection for Safe Tray - with utensils, or Safe Tray - NO utensils         Plan: Increase Zyprexa to 15 mg qhs.Will remain on 72 hour hold which expires on 12/02 at 0020  Rationale for change in precautions or plan: Continued stabilization.

## 2019-11-29 NOTE — PLAN OF CARE
Patient presents with a full range affect and calm mood. She appears sad at times and superficially bright. Social with staff and peers. Attended activity group. Cooperative and pleasant. Responding to voices.

## 2019-11-29 NOTE — PROGRESS NOTES
[facility] Psychiatric Progress Note      Interval History:   Pt seen, team meeting held with , nursing staff, OT, and PA's to review diagnosis and treatment plan. Staff report that patient is much better. She is still talking to herself at times. She says she had always talked to herself since childhood but she did not start hearing voices until 2 years ago. She says her medications are helping her with the voices and she denies experiencing any side effects. She says she plans to remain in [place] following discharge and may eventually get a place of her own. Tolerating medications well without significant side effects  Denies suicidal or homicidal ideation.      Review of systems:   The Review of Systems completed by Tl Licona MD is negative other than noted in the HPI     Medications:       OLANZapine zydis  5 mg Oral BID     vitamin C  500 mg Oral Daily     hydrOXYzine, ibuprofen, OLANZapine, OLANZapine zydis    Mental Status Examination:     Appearance:  awake, alert, dressed in hospital scrubs, appeared as age stated and well groomed  Attitude:  cooperative  Eye Contact:  good  Mood:  better  Affect:  mood congruent, labile and full range  Speech:  clear, coherent and normal prosody  Psychomotor Behavior:  no evidence of tardive dyskinesia, dystonia, or tics and intact station, gait and muscle tone  Thought Process:  logical, linear and goal oriented  Associations:  no loose associations  Thought Content:  no evidence of suicidal ideation or homicidal ideation and no evidence of psychotic thought  Insight:  limited  Judgment:  limited  Oriented to:  time, person, and place  Attention Span and Concentration:  limited  Recent and Remote Memory:  fair  Language: Able to name objects, Able to repeat phrases and Able to read and write  Fund of Knowledge: low-normal  Muscle Strength and Tone: normal  Gait and Station: Normal          Labs/Vitals:   /68 (BP Location:  Right arm)   Pulse 73   Temp 97.5  F (36.4  C) (Oral)   Resp 16   Ht 1.524 m (5')   Wt 52.3 kg (115 lb 6.4 oz)   SpO2 97%   BMI 22.54 kg/m    No results found for this or any previous visit (from the past 24 hour(s)).    Impression:   Donovan Jauregui is a 49-year-old  mother of 2, who reports no prior psychiatric contact or treatment, but presents with a 2-year history of experiencing auditory hallucinations in the absence of chemical use.  It appears she may have had an underlying psychotic illness before relocating to [place] with her sister.     11/29/2019: Tolerating medication and reporting less hallucinations.      DIagnoses:     1.  Psychotic disorder, unspecified.   2.  Amphetamine use disorder (urine positive for amphetamines) R/O amphetamine induced psychotic disorder.   3.  Rheumatoid arthritis by history.            Plan:   1. Written information given on medications. Side effects, risks, benefits reviewed.  2. Medication changes: Increase Zyprexa to 15 mg qhs.  3. Legal Status: 72-hour hold.   4. Continue current medications and move to SDU.     Attestation:  Patient has been seen and evaluated by me, Tl Licona MD today.    PATIENT ID  Name: Donovan Jauregui  MRN:[id number]  YOB: 1970

## 2019-11-29 NOTE — PROGRESS NOTES
11/29/19 1300   General Information   Date Initially Attended OT 11/29/19     Pt attended beginning of AM OT group addressing weekend planning and participated minimally with direct VCs. Pt left group early and did not return. More observation needed to complete initial evaluation at this time.

## 2019-11-30 PROCEDURE — 25000132 ZZH RX MED GY IP 250 OP 250 PS 637: Performed by: PSYCHIATRY & NEUROLOGY

## 2019-11-30 PROCEDURE — 12400000 ZZH R&B MH

## 2019-11-30 RX ADMIN — OLANZAPINE 10 MG: 10 TABLET, ORALLY DISINTEGRATING ORAL at 08:18

## 2019-11-30 RX ADMIN — IBUPROFEN 800 MG: 400 TABLET ORAL at 08:18

## 2019-11-30 RX ADMIN — OLANZAPINE 15 MG: 15 TABLET, ORALLY DISINTEGRATING ORAL at 20:01

## 2019-11-30 RX ADMIN — CALCIUM CARBONATE (ANTACID) CHEW TAB 500 MG 1000 MG: 500 CHEW TAB at 18:30

## 2019-11-30 RX ADMIN — CALCIUM CARBONATE (ANTACID) CHEW TAB 500 MG 1000 MG: 500 CHEW TAB at 12:20

## 2019-11-30 RX ADMIN — OXYCODONE HYDROCHLORIDE AND ACETAMINOPHEN 500 MG: 500 TABLET ORAL at 08:14

## 2019-11-30 ASSESSMENT — ACTIVITIES OF DAILY LIVING (ADL)
DRESS: INDEPENDENT
ORAL_HYGIENE: INDEPENDENT
ORAL_HYGIENE: INDEPENDENT;PROMPTS
LAUNDRY: WITH SUPERVISION
HYGIENE/GROOMING: INDEPENDENT
DRESS: INDEPENDENT
HYGIENE/GROOMING: INDEPENDENT;PROMPTS

## 2019-11-30 NOTE — PLAN OF CARE
Pt was at times present in the milieu and social with others. She appeared anxious and sad but brightened up when we conversed in Belarusian together. She is very polite, respectful and med compliant. Additionally, she frequented the restroom numerous times and notified staff that she was gaseous but denied diarrhea. Evening shift was otherwise unremarkable.

## 2019-11-30 NOTE — PLAN OF CARE
Problem: Psychotic Symptoms  Goal: Psychotic Symptoms  Description  Signs and symptoms of listed problems will be absent or manageable.  Flowsheets (Taken 11/30/2019 4485)  Psychotic Symptoms Assessed: all  Psychotic Symptoms Present: anxiety  Note:   Patient's vitals were WDL. Patient took PRN Zyprexa ODT 10 (mg) at 08:18. Patient was medication compliant and had no medication changes. Patient denies any suicidal ideation and does not endorse hallucinations. Patient states that she is anxious. Patient presents with a full range affect but appears anxious however she is pleasant.

## 2019-11-30 NOTE — PROGRESS NOTES
11/29/19 2116   Sleep/Rest/Relaxation   Day/Evening Time Hours up all shift  (up for most of the shift.)   Behavioral Health   Hallucinations appears responding   Thinking distractable   Orientation person: oriented;place: oriented   Memory baseline memory   Insight poor   Judgement impaired   Eye Contact at examiner   Affect sad   Mood anxious   Physical Appearance/Attire disheveled   Hygiene neglected grooming - unclean body, hair, teeth   Suicidality   (none stated. none observed.)   1. Wish to be Dead (Recent)   (none stated. none observed.)   2. Non-Specific Active Suicidal Thoughts (Recent)   (none stated. none observed.)   Self Injury   (none stated. none observed.)   Elopement   (none stated. none observed.)   Activity restless;withdrawn;isolative   Speech coherent;clear   Medication Sensitivity no observed side effects;no stated side effects   Psychomotor / Gait balanced;steady   Activities of Daily Living   Hygiene/Grooming independent   Oral Hygiene independent   Dress independent   Room Organization independent     Pt was at times present in the milieu and social with others. She appeared anxious and sad but brightened up when we conversed in Ukrainian together. She is very polite, respectful and med compliant. Additionally, she frequented the restroom numerous times and notified staff that she was gaseous but denied diarrhea. Evening shift was otherwise unremarkable.

## 2019-12-01 PROCEDURE — 25000132 ZZH RX MED GY IP 250 OP 250 PS 637: Performed by: PSYCHIATRY & NEUROLOGY

## 2019-12-01 PROCEDURE — 12400000 ZZH R&B MH

## 2019-12-01 PROCEDURE — 90853 GROUP PSYCHOTHERAPY: CPT

## 2019-12-01 RX ADMIN — IBUPROFEN 800 MG: 400 TABLET ORAL at 17:03

## 2019-12-01 RX ADMIN — OLANZAPINE 15 MG: 15 TABLET, ORALLY DISINTEGRATING ORAL at 20:36

## 2019-12-01 RX ADMIN — ALUMINUM HYDROXIDE, MAGNESIUM HYDROXIDE, AND DIMETHICONE 30 ML: 400; 400; 40 SUSPENSION ORAL at 16:34

## 2019-12-01 RX ADMIN — OXYCODONE HYDROCHLORIDE AND ACETAMINOPHEN 500 MG: 500 TABLET ORAL at 07:50

## 2019-12-01 RX ADMIN — ALUMINUM HYDROXIDE, MAGNESIUM HYDROXIDE, AND DIMETHICONE 30 ML: 400; 400; 40 SUSPENSION ORAL at 07:50

## 2019-12-01 ASSESSMENT — ACTIVITIES OF DAILY LIVING (ADL)
DRESS: SCRUBS (BEHAVIORAL HEALTH)
HYGIENE/GROOMING: INDEPENDENT;PROMPTS
ORAL_HYGIENE: INDEPENDENT
ORAL_HYGIENE: INDEPENDENT;PROMPTS
DRESS: SCRUBS (BEHAVIORAL HEALTH)
HYGIENE/GROOMING: INDEPENDENT

## 2019-12-01 NOTE — PLAN OF CARE
Pt denies SI/SIB. She rates depression and anxiety as both a 5/10 stating last night she had severe anxiety and could not sleep, she asked for medication to help with the anxiety for tonight. Pt reports hearing voices that tell her to stay still. During the evening shift she migrated between her room and the lounge frequently appearing anxious in her mannerisms and gait. Pt reports gas and bloating but denies diarrhea and constipation. She is friendly and very receptive to staff. Evening shift was otherwise unremarkable.

## 2019-12-01 NOTE — PLAN OF CARE
Problem: Psychotic Symptoms  Goal: Psychotic Symptoms  Description  Signs and symptoms of listed problems will be absent or manageable.  Flowsheets (Taken 12/1/2019 1404)  Psychotic Symptoms Assessed: all  Psychotic Symptoms Present: anxiety  Note:   Patient's vitals were WDL. Patient was medication compliant and took no psychiatric PRN medications. Patient stated that she feels much better today than before and but still feels anxious. Patient presents as restless but states that she can concentrate fine. Patient presents with a full range affect. Patient spent sometime on the SDU but spent most of her time in the [facility] lounge working on word puzzles and sudoku. Patient was social with peers even though she has trouble expressing herself in english. Patient asked if she could go home tomorrow.

## 2019-12-01 NOTE — PROGRESS NOTES
11/30/19 2016   Sleep/Rest/Relaxation   Sleep/Rest/Relaxation awake   Day/Evening Time Hours up all shift   Behavioral Health   Hallucinations auditory   Thinking poor concentration   Orientation person: oriented;place: oriented   Memory baseline memory   Insight poor   Judgement impaired   Eye Contact at examiner   Affect blunted, flat   Mood anxious;depressed   Physical Appearance/Attire disheveled   Hygiene neglected grooming - unclean body, hair, teeth   Suicidality   (pt denies)   1. Wish to be Dead (Recent)   (pt denies)   2. Non-Specific Active Suicidal Thoughts (Recent)   (pt denies)   Self Injury   (pt denies)   Elopement   (none observed. none stated.)   Activity   (present in the milieu.)   Speech clear;coherent   Medication Sensitivity no observed side effects;no stated side effects   Psychomotor / Gait balanced;steady   Activities of Daily Living   Hygiene/Grooming independent;prompts   Oral Hygiene independent;prompts   Dress independent   Room Organization prompts     Pt denies SI/SIB. She rates depression and anxiety as both a 5/10 stating last night she had severe anxiety and could not sleep, she asked for medication to help with the anxiety for tonight. Pt reports hearing voices that tell her to stay still. During the evening shift she migrated between her room and the lounge frequently appearing anxious in her mannerisms and gait. Pt reports gas and bloating but denies diarrhea and constipation. She is friendly and very receptive to staff. Evening shift was otherwise unremarkable.

## 2019-12-02 PROCEDURE — 25000132 ZZH RX MED GY IP 250 OP 250 PS 637: Performed by: PSYCHIATRY & NEUROLOGY

## 2019-12-02 PROCEDURE — 12400000 ZZH R&B MH

## 2019-12-02 RX ADMIN — OLANZAPINE 10 MG: 10 TABLET, ORALLY DISINTEGRATING ORAL at 07:58

## 2019-12-02 RX ADMIN — OXYCODONE HYDROCHLORIDE AND ACETAMINOPHEN 500 MG: 500 TABLET ORAL at 07:57

## 2019-12-02 RX ADMIN — IBUPROFEN 800 MG: 400 TABLET ORAL at 23:22

## 2019-12-02 RX ADMIN — OLANZAPINE 15 MG: 15 TABLET, ORALLY DISINTEGRATING ORAL at 20:12

## 2019-12-02 ASSESSMENT — ACTIVITIES OF DAILY LIVING (ADL)
HYGIENE/GROOMING: INDEPENDENT
LAUNDRY: WITH SUPERVISION
DRESS: INDEPENDENT
ORAL_HYGIENE: INDEPENDENT
DRESS: INDEPENDENT
LAUNDRY: WITH SUPERVISION
HYGIENE/GROOMING: INDEPENDENT
HYGIENE/GROOMING: INDEPENDENT
DRESS: INDEPENDENT
LAUNDRY: WITH SUPERVISION

## 2019-12-02 NOTE — PROGRESS NOTES
[facility] Progress Note      Interval History:   Pt seen, team meeting held with , nursing staff, OT, and PA's to review diagnosis and treatment plan. Staff report that she is doing better but still displays some degree of paranoia. She says her medication is helping with her paranoia and hallucinations. She denies self harm thoughts, plans or intent. She says she spoke with her sister and se believes she can stay with her following discharge.     Review of systems:   The Review of Systems completed by Tl Licona MD is negative other than noted in the HPI     Medications:       OLANZapine zydis  15 mg Oral At Bedtime     vitamin C  500 mg Oral Daily     alum & mag hydroxide-simethicone, calcium carbonate, hydrOXYzine, ibuprofen, OLANZapine, OLANZapine zydis    Mental Status Examination:     Appearance:  awake, alert, dressed in hospital scrubs, appeared as age stated and well groomed  Attitude:  cooperative  Eye Contact:  good  Mood:  better  Affect:  mood congruent, labile and full range  Speech:  clear, coherent and normal prosody  Psychomotor Behavior:  no evidence of tardive dyskinesia, dystonia, or tics and intact station, gait and muscle tone  Thought Process:  logical, linear and goal oriented  Associations:  no loose associations  Thought Content:  no evidence of suicidal ideation or homicidal ideation and no evidence of psychotic thought  Insight:  limited  Judgment:  limited  Oriented to:  time, person, and place  Attention Span and Concentration:  limited  Recent and Remote Memory:  fair  Language: Able to name objects, Able to repeat phrases and Able to read and write  Fund of Knowledge: low-normal  Muscle Strength and Tone: normal  Gait and Station: Normal          Labs/Vitals:   /76   Pulse 76   Temp 97.8  F (36.6  C) (Oral)   Resp 16   Ht 1.524 m (5')   Wt 52.3 kg (115 lb 6.4 oz)   SpO2 100%   BMI 22.54 kg/m    No results found  for this or any previous visit (from the past 24 hour(s)).    Impression:   Donovan Jauregui is a 49-year-old  mother of 2, who reports no prior psychiatric contact or treatment, but presents with a 2-year history of experiencing auditory hallucinations in the absence of chemical use.  It appears she may have had an underlying psychotic illness before relocating to [place] with her sister.     11/29/2019: Tolerating medication and reporting less hallucinations.      DIagnoses:     1.  Psychotic disorder, unspecified.   2.  Amphetamine use disorder (urine positive for amphetamines) R/O amphetamine induced psychotic disorder.   3.  Rheumatoid arthritis by history.            Plan:   1. Written information given on medications. Side effects, risks, benefits reviewed.  2. Medication changes: None.  3. Legal Status: 72-hour hold.   4. Continue current medications and move to SDU.     Attestation:  Patient has been seen and evaluated by me, Tl Licona MD today.    PATIENT ID  Name: Donovan Jauregui  MRN:[id number]  YOB: 1970

## 2019-12-02 NOTE — PROGRESS NOTES
"Pt attended AM OT group, however, participated minimally despite encouragement (for example, when asked to contribute to group discussion, pt stated, \"I haven't thought about it\").  "

## 2019-12-02 NOTE — PLAN OF CARE
Problem: Psychotic Symptoms  Goal: Psychotic Symptoms  Description  Signs and symptoms of listed problems will be absent or manageable.  Outcome: No Change  Flowsheets (Taken 12/2/2019 3685)  Psychotic Symptoms Assessed: all  Psychotic Symptoms Present: insight; thought process; mood; anxiety  Note:   Pt presents with a full range affect and anxious mood. Pt spent the majority of the shift either on the SDU or resting in her bed. Pt attended all unit programming and participated appropriately. Pt still slightly paranoid and needed 20 minutes of explanation to sign JOANA for sister. Pt still focused on discharging as soon as possible. Pt speaks to staff in Luxembourgish at times and needs to be reminded that not all staff speak Luxembourgish. Pt denies Si. Pt ate all of her food and was med compliant.

## 2019-12-02 NOTE — PROGRESS NOTES
called this morning and left a message for patient's sister, Dionne Dominguez ([phone]).  is hoping to discuss whether or not patient can return to living with her sister at discharge.

## 2019-12-02 NOTE — PLAN OF CARE
BEHAVIORAL TEAM DISCUSSION    Participants: MD, RN, CM, PA, OT   Progress: Improving   Anticipated length of stay: Possible discharge on Tuesday 12/03 if patient can return to her sister's house  Continued Stay Criteria/Rationale: Continued psychiatric stabilization  Medical/Physical: Per hospitalist consult  Precautions:   Behavioral Orders   Procedures    Code 1 - Restrict to Unit    Discontinue 1:1 attendant for suicide risk     Order Specific Question:   I have performed an in person assessment of the patient     Answer:   Based on this assessment the patient no longer requires a one on one attendant at this point in time.    Routine Programming     As clinically indicated    Status 15     Every 15 minutes.    Status Individual Observation     Order Specific Question:   CONTINUOUS 24 hours / day     Answer:   1 foot     Order Specific Question:   Indications for SIO     Answer:   Self-injury risk     Order Specific Question:   Indications for SIO     Answer:   Suicide risk     Order Specific Question:   Indications for SIO     Answer:   Medical equipment / ligature risk    Suicide precautions     Patients on Suicide Precautions should have a Combination Diet ordered that includes a Diet selection(s) AND a Behavioral Tray selection for Safe Tray - with utensils, or Safe Tray - NO utensils       Plan: Likely discharge tomorrow. Patient will need referral for follow up psychiatry. Continue current medications.   Rationale for change in precautions or plan: N/A

## 2019-12-02 NOTE — PLAN OF CARE
Pt denies SI/SIB, depression and anxiety. Per pt she continues to have auditory hallucinations but states her medications are helping with her symptoms and nighttime anxiety. Additionally she reports that her medications are helping with gas issues, she denies constipation and diarrhea. She states that she is having a good day and was able to talk to some family members which helped her have a better day. During the evening shift she was friendly, present and social in the milieu, she also attended groups and was was med complaint. Prompts are needed for oral and overall hygiene. Evening shift was otherwise unremarkable.

## 2019-12-03 PROCEDURE — 90853 GROUP PSYCHOTHERAPY: CPT

## 2019-12-03 PROCEDURE — 12400000 ZZH R&B MH

## 2019-12-03 PROCEDURE — 25000132 ZZH RX MED GY IP 250 OP 250 PS 637: Performed by: PSYCHIATRY & NEUROLOGY

## 2019-12-03 RX ORDER — OLANZAPINE 10 MG/1
20 TABLET, ORALLY DISINTEGRATING ORAL AT BEDTIME
Status: DISCONTINUED | OUTPATIENT
Start: 2019-12-03 | End: 2019-12-04 | Stop reason: HOSPADM

## 2019-12-03 RX ADMIN — IBUPROFEN 800 MG: 400 TABLET ORAL at 20:43

## 2019-12-03 RX ADMIN — HYDROXYZINE HYDROCHLORIDE 25 MG: 25 TABLET, FILM COATED ORAL at 20:43

## 2019-12-03 RX ADMIN — OLANZAPINE 20 MG: 10 TABLET, ORALLY DISINTEGRATING ORAL at 20:43

## 2019-12-03 RX ADMIN — ALUMINUM HYDROXIDE, MAGNESIUM HYDROXIDE, AND DIMETHICONE 30 ML: 400; 400; 40 SUSPENSION ORAL at 08:11

## 2019-12-03 RX ADMIN — OXYCODONE HYDROCHLORIDE AND ACETAMINOPHEN 500 MG: 500 TABLET ORAL at 08:11

## 2019-12-03 ASSESSMENT — ACTIVITIES OF DAILY LIVING (ADL)
ORAL_HYGIENE: INDEPENDENT
HYGIENE/GROOMING: INDEPENDENT
DRESS: STREET CLOTHES;INDEPENDENT

## 2019-12-03 ASSESSMENT — MIFFLIN-ST. JEOR: SCORE: 1098.51

## 2019-12-03 NOTE — PLAN OF CARE
Pt's sister Dionne Dominguez called asking about discharge plans for pt. Dionne wanted if pt's mental health has improved. States that she is willing to take patient but she is also trying to find a better living situation so that pt can reside with her; mentioned that she might need more time because she is also taking care of others as well. Had questions regarding follow up care after discharge. Information provided to Dionne. States that she will be available all day after 8 am in the morning. Best number that she can be reached is [phone].

## 2019-12-03 NOTE — PLAN OF CARE
Pt presents with anxious mood and full range affect. Pt spend a majority of the shift in her bed. Pt requested to attend activity group but decided to shower instead. Pt still acts slightly paranoid. Pt. Ate all her food and was med compliant. Pt denies SI.

## 2019-12-03 NOTE — PLAN OF CARE
Pt has been med compliant, polite and cooperative. Pt has attended groups. 1:1 with pt, pt spoke about her two children and becoming a grandmother again soon. Pt's daughter will be graduating from high school this year. Pt is thinking about an appropriate gift and is hoping to hear from her. Pt reports having talked with her sister and that she will reside with her sister after she discharges. Pt's sister has not returned the calls from the . Pt was asked to call her sister and ask her sister to call the hospital. Affect is animated, brightens during conversation. Mood is calm. Thought process Pt appears internally stimulated at times.

## 2019-12-03 NOTE — PROGRESS NOTES
[facility] Psychiatric Progress Note      Interval History:   Pt seen, team meeting held with , nursing staff, OT, and PA's to review diagnosis and treatment plan. Patient was very paranoid yesterday afternoon when she was asked to sign a Release of Information (JOANA) for her sister to facilitate discharge planning.  Time was spent explaining the reason behind the release of information and she eventually signed the release.  The unit  called and left messages for her sister but she is yet to call back.  Patient tells me that she is going to be residing with her sister upon discharge from the hospital.  She explained her paranoia in the context of a previous experience with her daughter where she believes she was tricked into signing papers that caused her custody of her daughter who reportedly now resides in [place] with the daughter's father.  She did share that she has a son who resides in [place] with her ex-.  She denies experiencing any self-harm thoughts plans and intent and is hoping to stay on her currently prescribed olanzapine which did not is helping her.  She is still observed talking to herself on the unit but she reports minimal auditory hallucinations.   Review of systems:   The Review of Systems completed by Tl Licona MD is negative other than noted in the HPI     Medications:       OLANZapine zydis  15 mg Oral At Bedtime     vitamin C  500 mg Oral Daily     alum & mag hydroxide-simethicone, calcium carbonate, hydrOXYzine, ibuprofen, OLANZapine, OLANZapine zydis    Mental Status Examination:     Appearance:  awake, alert, dressed in hospital scrubs, appeared as age stated and well groomed  Attitude:  cooperative  Eye Contact:  good  Mood:  better  Affect:  mood congruent, labile and full range  Speech:  clear, coherent and normal prosody  Psychomotor Behavior:  no evidence of tardive dyskinesia, dystonia, or tics and intact station,  gait and muscle tone  Thought Process:  logical, linear and goal oriented  Associations:  no loose associations  Thought Content:  no evidence of suicidal ideation or homicidal ideation and no evidence of psychotic thought  Insight:  fair  Judgment:  fair  Oriented to:  time, person, and place  Attention Span and Concentration:  limited  Recent and Remote Memory:  fair  Language: Able to name objects, Able to repeat phrases and Able to read and write  Fund of Knowledge: low-normal  Muscle Strength and Tone: normal  Gait and Station: Normal          Labs/Vitals:   /83   Pulse 68   Temp 97.4  F (36.3  C) (Oral)   Resp 16   Ht 1.524 m (5')   Wt 56.2 kg (123 lb 14.4 oz)   SpO2 99%   BMI 24.20 kg/m    No results found for this or any previous visit (from the past 24 hour(s)).    Impression:   Donovan Jauregui is a 49-year-old  mother of 2, who reports no prior psychiatric contact or treatment, but presents with a 2-year history of experiencing auditory hallucinations in the absence of chemical use.  It appears she may have had an underlying psychotic illness before relocating to [place] with her sister.     2019: Tolerating medication and reporting less hallucinations.      DIagnoses:     1.  Psychotic disorder, unspecified.  Rule out schizophrenia.   2.  Amphetamine use disorder (urine positive for amphetamines) R/O amphetamine induced psychotic disorder.   3.  Rheumatoid arthritis by history.            Plan:   1. Written information given on medications. Side effects, risks, benefits reviewed.  2. Medication changes: None.  3. Legal Status: 72-hour hold has now  and she is remaining in the hospital as a voluntary patient..   4.  Increase Zyprexa to 20 mg p.o. nightly.     Attestation:  Patient has been seen and evaluated by me, Tl Licona MD today.    PATIENT ID  Name: Donovan Jauregui  MRN:[id number]  YOB: 1970

## 2019-12-03 NOTE — PLAN OF CARE
Patient attended Process Group today and participated appropriately.  Patient exhibited good insight into the topic of discussion.

## 2019-12-04 VITALS
HEIGHT: 60 IN | HEART RATE: 76 BPM | TEMPERATURE: 97.7 F | WEIGHT: 123.9 LBS | RESPIRATION RATE: 16 BRPM | OXYGEN SATURATION: 97 % | DIASTOLIC BLOOD PRESSURE: 70 MMHG | BODY MASS INDEX: 24.32 KG/M2 | SYSTOLIC BLOOD PRESSURE: 115 MMHG

## 2019-12-04 PROCEDURE — 25000132 ZZH RX MED GY IP 250 OP 250 PS 637: Performed by: PSYCHIATRY & NEUROLOGY

## 2019-12-04 RX ORDER — OLANZAPINE 20 MG/1
20 TABLET ORAL AT BEDTIME
Qty: 30 TABLET | Refills: 0 | Status: SHIPPED | OUTPATIENT
Start: 2019-12-04 | End: 2020-01-03

## 2019-12-04 RX ADMIN — OXYCODONE HYDROCHLORIDE AND ACETAMINOPHEN 500 MG: 500 TABLET ORAL at 08:13

## 2019-12-04 RX ADMIN — IBUPROFEN 800 MG: 400 TABLET ORAL at 14:18

## 2019-12-04 ASSESSMENT — ACTIVITIES OF DAILY LIVING (ADL)
DRESS: INDEPENDENT
ORAL_HYGIENE: INDEPENDENT
HYGIENE/GROOMING: INDEPENDENT

## 2019-12-04 NOTE — PLAN OF CARE
Mood is calm, visible, social with peers, denies any suicidal ideation.    Reviewed discharge instructions in Danish with patient, education provided about psychosis, methamphetamine use, medications and follow up appointments.      Spoke with sister Dionne and she said that Donovan is going to live with her and she will be here around 3:00 pm.am to pick her up.

## 2019-12-04 NOTE — PROGRESS NOTES
Notes from staff nurse from last evening discussed with psychiatrist. It was decided that it may be helpful to have a family meeting in order to directly discuss patient's disposition with patient's sister. Tentative plan is for family meeting on Friday, 12/06/19 at 1000.  called patient's sister, Dionne Dominguez at [phone], and left a message to see if she is able to attend a family meeting on Friday.  awaiting call back.

## 2019-12-04 NOTE — DISCHARGE INSTRUCTIONS
Behavioral Discharge Planning and Instructions    Summary:  Admitted for disorganized thinking.     Main Diagnosis:    Psychotic Disorder, unspecified;  Amphetamine Use Disorder (urine positive for amphetamines) R/O Amphetamine Induced Psychotic Disorder; Rheumatoid Arthritis by history.     Major Treatments, Procedures and Findings:  Psychiatric assessment. Medication adjustment.     Symptoms to Report: Feeling more agitated, Increased confusion or psychosis, Losing more sleep, Mood getting worse or Thoughts of suicide    Lifestyle Adjustment:  Follow all treatment recommendations. Develop and follow safety plan. Your safety plan is your contract with yourself to keep you safe in a crisis. Be sure to use the resources and crisis numbers listed on your safety plan.  Abstain from the use of all mood-altering substances, including alcohol and methamphetamines, as usage may increase symptoms of psychosis as well as interfere with the effectiveness of any prescribed medications.    Psychiatry Follow-up:     You have an appointment for medication management with ALEIDA Farrell at [facility] in [place] on Thursday, December 12, 2019 at 9:00 am. Please arrive at 8:45 am for check in. It is important that you keep this appointment so that you can get your medications refilled.     [facility] [place] [address]  [place] [address]  [phone] / Fax: [phone]    If you decide to stay in [place] and need assistance in setting up insurance in this state, please call [facility] Healthcare Assistance at  [phone].     If you need assistance with housing in the future, please call [facility] Housing Assistance at [phone].     Resources:   Crisis Intervention: 780.579.1946 or 423-841-1706 (TTY: 505.946.8401).  Call anytime for help.  National [place] on Mental Illness (www.mn.jacob.org): 294.721.5710 or 899-931-6898.  Alcoholics Anonymous  (www.alcoholics-anonymous.org): Check your phone book for your local chapter.  National Suicide Prevention Line (www.mentalhealthmn.org): 230-841-VTON (8953)  COPE (Crisis Services for Adults) in [facility]: 251.101.8093 (Available 24/7)    General Medication Instructions:   See your medication sheet(s) for instructions.   Take all medicines as directed.  Make no changes unless your doctor suggests them.   Go to all your doctor visits.  Be sure to have all your required lab tests. This way, your medicines can be refilled on time.  Do not use any drugs not prescribed by your doctor.  Avoid alcohol.

## 2019-12-04 NOTE — PROGRESS NOTES
12/03/19 2100   Art Therapy   Type of Intervention structured groups   Response participated   Hours 1   Treatment Detail   Art Therapy   Goal- cope, express, regulate and reflect through Art Therapy directives    Pt was pleasant,cooperative and social. She enjoyed working on a stenciled t-shirt with her name on it. She expressed gratitude to the pt that shared the materials with the group.

## 2019-12-04 NOTE — PROGRESS NOTES
Print outs of psychosis and methamphetamine given to patient in [place] per her request.  Discharged completed in [place] as well.

## 2019-12-04 NOTE — PROGRESS NOTES
Patient's nurse notified  that patient's sister is willing to pick her up this afternoon. This was discussed with psychiatrist who was in agreement with patient discharging home today.  met with patient to have her sign a release of information form for [facility] in [place], so that follow up care could be set up. Patient was agreeable to signing the JOANA, and requested a copy of the signed document. This was given to patient.  called [place] and set up a follow up appointment for medication management with Keisha Snider NP at [facility] on Thursday 12/12/19 at 0900.      called and updated Kelsie, the care coordinator for patient's insurance company, [employer] ([phone]),  with the above discharge plans.

## 2019-12-04 NOTE — PLAN OF CARE
Donovan reported , she is ready to go home and she would go to live with her sister Dionne and Doinne can pick her up.   Spoke with her sister and she said she would be here around 14:30

## 2019-12-04 NOTE — PLAN OF CARE
Problem: Anxiety  Goal: Anxiety Reduction or Resolution  Note:   Pt rates anxiety 4/10. Prn Hydroxyzine given. Pt remained visible in the lounge. Was social and cooperative. Pt sister called wanting an update regarding discharge plans. SW was informed. Information documented regarding sister's response to care for patient upon discharge. SW will call patient anytime after 8am.

## 2020-04-22 NOTE — DISCHARGE SUMMARY
[facility]    Discharge Summary  Adult Psychiatry    Date of Admission:  11/24/2019  Date of Discharge:  12/4/2019  3:55 PM  Discharging Provider: Tl Lciona MD      Discharge Diagnoses   1.  Psychotic disorder, unspecified.   2.  Amphetamine induced psychotic disorder.   3.  Rheumatoid arthritis by history.     History of Present Illness   Donovan Jauregui, as she prefers to be addressed, is a 49-year-old woman who reports she is  and has a 35-year-old son and an 18-year-old daughter.  She reports she is disabled on account of history of rheumatoid arthritis and last worked in [place].  She presented to [facility] ED with her sister on account of disorganized thinking.  The patient reportedly had been moved to [place] by her sister because her  could no longer care for her.  She was admitted on a 72-hour hold that expires on 11/28/2019 at 12:13 a.m.  Information was gathered through direct patient contact as well as chart review. For more details, I refer the reader to the initial psychiatric assessment documented on admission by Tl Licona MD in addition to the information provided by Elodia Naylor PA-C on 11/26/2019.    Hospital Course   Donovan Jauregui was admitted on 11/24/2019.  Following admission to the intensive treatment center, the patient was introduced to the milieu and she was encouraged to participate in individual milieu and group therapy.  She was initially quite withdrawn and pretended she did not understand English but staff or able to converse with her with the assistance of a .  However, with the benefit of medications she began to engage with the treatment team even though she remained quite paranoid and insisted that she had not utilized any chemicals prior to her admission.  She admitted to experiencing auditory hallucinations for the last couple years and also admitted to  feeling overly stressed because of her 's stroke.  She was offered olanzapine at 5 mg twice daily and encouraged to participate in individual milieu and group therapy. Collateral data gathered from her sister who brought her to the ED suggest that she was brought in to the hospital on account of her disorganized thinking.  Her sister had moved her to [place] from [place] as her ex- could no longer care for her in [place].  The patient believes that she was brought to the hospital on account of her arthritis.  She was notably paranoid on the unit and later explained that her paranoia was in the context of her previous experience with her daughter where she believes she was tricked into signing papers that cost her daughter who reportedly resides in [place] with the daughter's father.  The patient soon began to report absence of auditory or visual hallucinations.  Once her 72-hour hold , the patient requested to be discharged.  As she did not know report any untoward effects from her medications and denied any self-harm thoughts plans or intent, her sister was advised of her discharge plans and the patient was ultimately discharged from the mental health unit to her sister's custody.    Tl Licona MD    Significant Results and Procedures   Please see below.    Unresulted Labs Ordered in the Past 30 Days of this Admission     No orders found from 10/25/2019 to 2019.          Code Status   Full Code    Primary Care Physician   Physician No Ref-Primary    Physical Exam   Appearance:  awake, alert, adequately groomed and casually dressed  Attitude:  cooperative  Eye Contact:  good  Mood:  good  Affect:  appropriate and in normal range and mood congruent  Speech:  clear, coherent and normal prosody  Psychomotor Behavior:  no evidence of tardive dyskinesia, dystonia, or tics and intact station, gait and muscle tone  Thought Process:  logical, linear and goal  oriented  Associations:  no loose associations  Thought Content:  no evidence of suicidal ideation or homicidal ideation and mild paranoia  Insight: Limited.    Judgment:  intact  Oriented to:  time, person, and place  Attention Span and Concentration:  intact  Recent and Remote Memory:  intact  Language: Able to name objects and Able to repeat phrases  Fund of Knowledge: appropriate  Muscle Strength and Tone: normal  Gait and Station: Normal    Time Spent on this Encounter   ITl MD, personally saw the patient today and spent greater than 30 minutes discharging this patient.    Discharge Disposition   Discharged to home  Condition at discharge: Stable    PSYCHIATRY FOLLOW-UP:  You have an appointment for medication management with ALEIDA Farrell at [facility] in [place] on Thursday, December 12, 2019 at 9:00 am. Please arrive at 8:45 am for check in. It is important that you keep this appointment so that you can get your medications refilled.     [facility] [address]  [place] [address]  [phone] / Fax: [phone]    If you decide to stay in [place] and need assistance in setting up insurance in this state, please call [facility] at  [phone].     If you need assistance with housing in the future, please call [facility] at [phone].     Consultations This Hospital Stay   PSYCHIATRY IP CONSULT  HOSPITALIST IP CONSULT    Discharge Orders   No discharge procedures on file.  Discharge Medications   Discharge Medication List as of 12/4/2019 12:46 PM      START taking these medications    Details   OLANZapine (ZYPREXA) 20 MG tablet Take 1 tablet (20 mg) by mouth At Bedtime, Disp-30 tablet, R-0, E-Prescribe         CONTINUE these medications which have NOT CHANGED    Details   ibuprofen (ADVIL/MOTRIN) 200 MG tablet Take 800 mg by mouth every 8 hours as  needed for mild pain, Historical      vitamin C (ASCORBIC ACID) 250 MG tablet Take 500 mg by mouth daily Unsure of strength, Historical           Allergies   No Known Allergies  Data   Most Recent 3 CBC's:  Recent Labs   Lab Test 11/24/19 2026   WBC 7.6   HGB 12.2   MCV 86         Most Recent 3 BMP's:  Recent Labs   Lab Test 11/24/19 2026      POTASSIUM 3.4   CHLORIDE 107   CO2 27   BUN 15   CR 0.53   ANIONGAP 3   YOKO 8.4*   *     Most Recent 2 LFT's:  Recent Labs   Lab Test 11/24/19 2026   AST 10   ALT 15   ALKPHOS 95   BILITOTAL 0.8      Panel:No lab results found.  Most Recent 6 Bacteria Isolates From Any Culture (See EPIC Reports for Culture Details):  Recent Labs   Lab Test 11/24/19 2026   CULT 10,000 to 50,000 colonies/mL  mixed urogenital ryan  Susceptibility testing not routinely done       Most Recent TSH, T4 and A1c Labs:  Recent Labs   Lab Test 11/24/19 2026   TSH 2.38     No results found for this or any previous visit.